# Patient Record
Sex: FEMALE | Race: BLACK OR AFRICAN AMERICAN | NOT HISPANIC OR LATINO | Employment: UNEMPLOYED | URBAN - METROPOLITAN AREA
[De-identification: names, ages, dates, MRNs, and addresses within clinical notes are randomized per-mention and may not be internally consistent; named-entity substitution may affect disease eponyms.]

---

## 2023-10-03 ENCOUNTER — HOSPITAL ENCOUNTER (EMERGENCY)
Facility: HOSPITAL | Age: 23
Discharge: HOME | End: 2023-10-03
Attending: EMERGENCY MEDICINE | Admitting: EMERGENCY MEDICINE
Payer: COMMERCIAL

## 2023-10-03 VITALS
DIASTOLIC BLOOD PRESSURE: 77 MMHG | HEART RATE: 104 BPM | TEMPERATURE: 97.7 F | OXYGEN SATURATION: 98 % | SYSTOLIC BLOOD PRESSURE: 120 MMHG | RESPIRATION RATE: 15 BRPM

## 2023-10-03 DIAGNOSIS — J06.9 VIRAL URI: Primary | ICD-10-CM

## 2023-10-03 LAB
FLUAV RNA RESP QL NAA+PROBE: NOT DETECTED
FLUBV RNA RESP QL NAA+PROBE: NOT DETECTED
RSV RNA NPH QL NAA+NON-PROBE: NOT DETECTED
S PYO AG THROAT QL: NEGATIVE
SARS-COV-2 RNA RESP QL NAA+PROBE: NOT DETECTED

## 2023-10-03 PROCEDURE — 87637 SARSCOV2&INF A&B&RSV AMP PRB: CPT | Performed by: EMERGENCY MEDICINE

## 2023-10-03 PROCEDURE — 87081 CULTURE SCREEN ONLY: CPT | Performed by: EMERGENCY MEDICINE

## 2023-10-03 PROCEDURE — 99283 EMERGENCY DEPT VISIT LOW MDM: CPT

## 2023-10-03 PROCEDURE — 87880 STREP A ASSAY W/OPTIC: CPT | Performed by: EMERGENCY MEDICINE

## 2023-10-03 RX ORDER — ACETAMINOPHEN 500 MG
1000 TABLET ORAL ONCE
Status: COMPLETED | OUTPATIENT
Start: 2023-10-03 | End: 2023-10-03

## 2023-10-03 RX ADMIN — ACETAMINOPHEN 1000 MG: 500 TABLET ORAL at 14:54

## 2023-10-03 NOTE — Clinical Note
[facility] EMERGENCY DEPARTMENT  [address] [facility] [address]  Phone: [phone]    Mady Avendano was seen and treated in our emergency department on 10/3/2023.  She may return to work on 10/06/2023.         Thank you for choosing [facility].    Finn Gottlieb MD

## 2023-10-03 NOTE — ED PROVIDER NOTES
EMERGENCY DEPARTMENT ENCOUNTER    Room Number:  T02/02  Date seen:  10/3/2023  PCP: Provider, No Known  Historian: Patient      HPI:  Chief Complaint: Generalized body aches  A complete HPI/ROS/PMH/PSH/SH/FH are unobtainable due to:   Context: Mady Avendano is a 23 y.o. female who presents to the ED c/o generalized body aches.  Sore throat, headache and body aches.  Symptoms began about 2 days ago.  Has had occasional dry cough.  No chest pain or shortness of breath.  Patient states she did a COVID test that was negative.  Denies ill exposure.  Works as a CNA.      MEDICAL RECORD REVIEW (non ED)  [facility]  Patient's been seen numerous times for vaginal discharge.  I see no admissions or primary care visits.    PAST MEDICAL HISTORY  Active Ambulatory Problems     Diagnosis Date Noted    No Active Ambulatory Problems     Resolved Ambulatory Problems     Diagnosis Date Noted    No Resolved Ambulatory Problems     No Additional Past Medical History         PAST SURGICAL HISTORY  No past surgical history on file.      FAMILY HISTORY  No family history on file.      SOCIAL HISTORY  Social History     Socioeconomic History    Marital status: Single         ALLERGIES  Patient has no known allergies.        REVIEW OF SYSTEMS  Review of Systems   Constitutional:  Negative for fever.   HENT:  Positive for sore throat.    Respiratory:  Positive for cough. Negative for shortness of breath.    Cardiovascular:  Negative for chest pain.   Genitourinary:         LMP just under 1 month ago, states confident she is not pregnant   Musculoskeletal:  Positive for myalgias.   All other systems reviewed and are negative.         PHYSICAL EXAM  ED Triage Vitals [10/03/23 1414]   Temp Heart Rate Resp BP SpO2   97.7 °F (36.5 °C) 104 15 -- 98 %      Temp src Heart Rate Source Patient Position BP Location FiO2 (%)   -- -- -- -- --       Physical Exam    GENERAL: Alert and well-appearing  female who is texting on her phone as I enter the room.  She is in no obvious distress.  Triage vitals reviewed notable for temperature 97.7.  O2 sats 98% on room air and pulse is 104.  HENT: nares patent, no tonsillar swelling or exudate.  There is mild bilateral cervical adenopathy  EYES: no scleral icterus  CV: regular rhythm, regular rate  RESPIRATORY: normal effort, clear to auscultation bilaterally-O2 sats 98% on room air  ABDOMEN: soft, mild lower abdominal tenderness without rebound or guarding  MUSCULOSKELETAL: no deformity  NEURO: Strength sensation and coordination are grossly intact.  Speech and mentation are unremarkable  SKIN: warm, dry      Vital signs and nursing notes reviewed.          LAB RESULTS  Recent Results (from the past 24 hour(s))   Rapid Strep A Screen - Swab, Throat    Collection Time: 10/03/23  2:53 PM    Specimen: Throat; Swab   Result Value Ref Range    Strep A Ag Negative Negative   COVID-19, FLU A/B, RSV PCR - Swab, Nasopharynx    Collection Time: 10/03/23  2:53 PM    Specimen: Nasopharynx; Swab   Result Value Ref Range    COVID19 Not Detected Not Detected - Ref. Range    Influenza A PCR Not Detected Not Detected    Influenza B PCR Not Detected Not Detected    RSV, PCR Not Detected Not Detected       Ordered the above labs and independently interpreted results. My findings will be discussed in the medical decision making section below        RADIOLOGY  No Radiology Exams Resulted Within Past 24 Hours          PROCEDURES  Procedures          MEDICATIONS GIVEN IN ER  Medications   acetaminophen (TYLENOL) tablet 1,000 mg (1,000 mg Oral Given 10/3/23 1454)               MEDICAL DECISION MAKING, PROGRESS, and CONSULTS    All labs have been independently reviewed by me.  All radiology studies have been reviewed by me and I have also reviewed the radiology report.   EKG's independently viewed and interpreted by me.  Discussion below represents my analysis of pertinent findings related to  patient's condition, differential diagnosis, treatment plan and final disposition.      Additional sources:  - Discussed/ obtained information from independent historians:      - External (non-ED) record review: Please see documented above    - Chronic or social conditions impacting care: No primary care provider    - Shared decision making: I discussed ED evaluation and treatment plan with patient who is in agreement.  Patient with sore throat, myalgias and headache.    ED testing including strep screen, viral panel unremarkable.  Suspect likely viral illness and will treat supportively      Orders placed during this visit:  Orders Placed This Encounter   Procedures    Rapid Strep A Screen - Swab, Throat    COVID-19, FLU A/B, RSV PCR - Swab, Nasopharynx    Beta Strep Culture, Throat - Swab, Throat           Differential diagnosis:    Please see as documented below in ED course      Independent interpretation of labs, radiology studies, and discussions with consultants:  ED Course as of 10/03/23 1601   Tue Oct 03, 2023   1444 [id number] CNA presents with sore throat, myalgias and some headache.    On exam she is well-appearing and afebrile.  O2 sats are benign.  There are some mild cervical adenopathy.  Tonsils are unremarkable without swelling or exudate.    Assessment-suspect likely viral upper respiratory infection.  Low suspicion for strep but will go ahead and do a rapid strep and treat if positive.  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    Treat with Tylenol p.o. here in the ED. [DB]   1552 Strep screen is negative for streptococcal illness.  With cough and other symptoms suspect this is more likely viral in nature.  Awaiting on viral panel. [DB]   1558 Viral panel is negative for tested pathogens including COVID, influenza and RSV. [DB]   1558 At this point patient is well-appearing but with likely viral URI.  We will give her a note for work and recommend supportive care  including Tylenol fluids and rest.  I do not see indication for antibiotics at this time. [DB]      ED Course User Index  [DB] Finn Gottlieb MD               DIAGNOSIS  Final diagnoses:   Viral URI         DISPOSITION  DISCHARGE    Patient discharged in stable condition.    Reviewed implications of results, diagnosis, meds, responsibility to follow up, warning signs and symptoms of possible worsening, potential complications and reasons to return to ER, including worsening symptoms or as needed.    Patient/Family voiced understanding of above instructions.    Discussed plan for discharge, as there is no emergent indication for admission. Patient referred to primary care provider for BP management due to today's BP. Pt/family is agreeable and understands need for follow up and repeat testing.  Pt is aware that discharge does not mean that nothing is wrong but it indicates no emergency is present that requires admission and they must continue care with follow-up as given below or physician of their choice.     FOLLOW-UP  PATIENT CONNECTION - [facility] [id number]  [phone]  In 1 week  Call for Appointment, If Not Better         Medication List      No changes were made to your prescriptions during this visit.                   Latest Documented Vital Signs:  As of 16:01 EDT  BP- 120/77 HR- 104 Temp- 97.7 °F (36.5 °C) O2 sat- 98%              --    Please note that portions of this were completed with a voice recognition program.       Note Disclaimer: At [facility], we believe that sharing information builds trust and better relationships. You are receiving this note because you are receiving care at [facility] or recently visited. It is possible you will see health information before a provider has talked with you about it. This kind of information can be easy to misunderstand. To help you fully understand what it means for your health, we urge you to discuss this note with your  provider.             Finn Gottlieb MD  10/03/23 1329

## 2023-10-05 LAB — BACTERIA SPEC AEROBE CULT: NORMAL
